# Patient Record
Sex: FEMALE | Race: WHITE
[De-identification: names, ages, dates, MRNs, and addresses within clinical notes are randomized per-mention and may not be internally consistent; named-entity substitution may affect disease eponyms.]

---

## 2019-04-09 ENCOUNTER — HOSPITAL ENCOUNTER (EMERGENCY)
Dept: HOSPITAL 96 - M.ERS | Age: 39
Discharge: HOME | End: 2019-04-09
Payer: COMMERCIAL

## 2019-04-09 VITALS — BODY MASS INDEX: 31.89 KG/M2 | WEIGHT: 180.01 LBS | HEIGHT: 63 IN

## 2019-04-09 VITALS — SYSTOLIC BLOOD PRESSURE: 145 MMHG | DIASTOLIC BLOOD PRESSURE: 87 MMHG

## 2019-04-09 DIAGNOSIS — M32.9: ICD-10-CM

## 2019-04-09 DIAGNOSIS — R56.9: Primary | ICD-10-CM

## 2019-04-09 DIAGNOSIS — D68.0: ICD-10-CM

## 2019-04-09 LAB
ABSOLUTE BASOPHILS: 0.1 THOU/UL (ref 0–0.2)
ABSOLUTE EOSINOPHILS: 0.1 THOU/UL (ref 0–0.7)
ABSOLUTE MONOCYTES: 0.9 THOU/UL (ref 0–1.2)
ALBUMIN SERPL-MCNC: 4 G/DL (ref 3.4–5)
ALP SERPL-CCNC: 93 U/L (ref 46–116)
ALT SERPL-CCNC: 22 U/L (ref 30–65)
ANION GAP SERPL CALC-SCNC: 11 MMOL/L (ref 7–16)
APTT BLD: 39.7 SECONDS (ref 25–31.3)
AST SERPL-CCNC: 17 U/L (ref 15–37)
BASOPHILS NFR BLD AUTO: 1.1 %
BILIRUB SERPL-MCNC: 0.5 MG/DL
BUN SERPL-MCNC: 3 MG/DL (ref 7–18)
CALCIUM SERPL-MCNC: 8.5 MG/DL (ref 8.5–10.1)
CHLORIDE SERPL-SCNC: 94 MMOL/L (ref 98–107)
CO2 SERPL-SCNC: 27 MMOL/L (ref 21–32)
CREAT SERPL-MCNC: 0.9 MG/DL (ref 0.6–1.3)
EOSINOPHIL NFR BLD: 0.8 %
GLUCOSE SERPL-MCNC: 104 MG/DL (ref 70–99)
GRANULOCYTES NFR BLD MANUAL: 56.1 %
HCT VFR BLD CALC: 39.4 % (ref 37–47)
HGB BLD-MCNC: 13.7 GM/DL (ref 12–15)
INR PPP: 1
LYMPHOCYTES # BLD: 2.6 THOU/UL (ref 0.8–5.3)
LYMPHOCYTES NFR BLD AUTO: 31.1 %
MCH RBC QN AUTO: 30.2 PG (ref 26–34)
MCHC RBC AUTO-ENTMCNC: 34.6 G/DL (ref 28–37)
MCV RBC: 87.1 FL (ref 80–100)
MONOCYTES NFR BLD: 10.9 %
MPV: 9.8 FL. (ref 7.2–11.1)
NEUTROPHILS # BLD: 4.7 THOU/UL (ref 1.6–8.1)
NUCLEATED RBCS: 0 /100WBC
PLATELET COUNT*: 210 THOU/UL (ref 150–400)
POTASSIUM SERPL-SCNC: 3.4 MMOL/L (ref 3.5–5.1)
PROT SERPL-MCNC: 7.2 G/DL (ref 6.4–8.2)
PROTHROMBIN TIME: 10.6 SECONDS (ref 9.2–11.5)
RBC # BLD AUTO: 4.53 MIL/UL (ref 4.2–5)
RDW-CV: 12.6 % (ref 10.5–14.5)
SODIUM SERPL-SCNC: 132 MMOL/L (ref 136–145)
TROPONIN-I LEVEL: <0.06 NG/ML (ref ?–0.06)
WBC # BLD AUTO: 8.4 THOU/UL (ref 4–11)

## 2019-04-10 NOTE — EKG
[place] [address]
Phone:  [phone]                     ELECTROCARDIOGRAM REPORT      
_______________________________________________________________________________
 
Name:       KAYLA MAYNARD               Room:                      [facility]#:  [id number]      Account #:      [id number]  
Admission:  19     Attend Phys:                         
Discharge:  19     Date of Birth:  [birth year]  
         Report #: [id number]
    [id number]
_______________________________________________________________________________
THIS REPORT FOR:  //name//                      
 
                         [facility]
                                       
Test Date:    2019               Test Time:    21:11:11
Pat Name:     KAYLA MAYNARD           Department:   
Patient ID:   [id number]            Room:          
Gender:       F                        Technician:   ELIAN SANDOVAL
:          1980               Requested By: Tim Kumar
Order Number: [id number] MD:   Teodoro Fitch
                                 Measurements
Intervals                              Axis          
Rate:         119                      P:            66
NJ:           155                      QRS:          65
QRSD:         103                      T:            -15
QT:           339                                    
QTc:          478                                    
                           Interpretive Statements
Sinus tachycardia
Probable left atrial enlargement
Borderline repolarization abnormality
No previous ECG available for comparison
 
Electronically Signed On 4- 14:17:15 CDT by Teodoro Fitch
[email or web address]
 
 
 
 
 
 
 
 
 
 
 
 
 
 
 
 
 
 
  <ELECTRONICALLY SIGNED>
                                           By: Teodoro Fitch MD, [facility]     
  04/10/19     1417
D: 19 211   _____________________________________
T: 19   Teodoro Fitch MD, FACC       /EPI

## 2021-06-19 ENCOUNTER — HOSPITAL ENCOUNTER (EMERGENCY)
Dept: HOSPITAL 96 - M.ERS | Age: 41
LOS: 1 days | Discharge: HOME | End: 2021-06-20
Payer: COMMERCIAL

## 2021-06-19 VITALS — WEIGHT: 210.01 LBS | HEIGHT: 63 IN | BODY MASS INDEX: 37.21 KG/M2
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
## 2021-06-19 DIAGNOSIS — S90.01XA: Primary | ICD-10-CM

## 2021-06-19 DIAGNOSIS — L03.115: ICD-10-CM

## 2021-06-20 VITALS — DIASTOLIC BLOOD PRESSURE: 65 MMHG | SYSTOLIC BLOOD PRESSURE: 155 MMHG
